# Patient Record
(demographics unavailable — no encounter records)

---

## 2025-03-27 NOTE — DISCUSSION/SUMMARY
[de-identified] : Based upon failed physical therapy failed oral medications and a gradual 2-year decline of cervical pain discomfort functionality worsening sensory deficits and now mild complaints of motor deficits recommending a cervical MRI to evaluate foraminal compromise and evaluate the extent of cervical degenerative disc disease that is evident on the x-ray x-rays clearly demonstrate C4-C5 C5-C6 cervical spondylosis.  And a previous MRI showing degenerative disc disease with there is associated neuroforaminal compromise.  Patient will follow-up after her cervical MRI is complete.  We started to discuss ACDF at C4-C5 and C5-C6 patient feels as if she has maximized conservative care over the last 2 years everything works in a very transient manner.  Physical therapy soft tissue modalities anti-inflammatories oral steroids.  She is concerned because of the worsening paresthesias and now a left upper extremity motor deficit.

## 2025-03-27 NOTE — HISTORY OF PRESENT ILLNESS
[de-identified] : Leticia is well-known to me for a lumbar spinal fusion we underwent many years ago.  She has done very very well essentially 100% asymptomatic from a lumbar surgical standpoint.  She has been battling progressive worsening neck pain for approximately 2 years.  Last MRI of the cervical spine was 2023 she has failed conservative care oral medications and she states her axial/mechanically orientate neck pain is getting worse.  She is also complaining of worsening left greater than right radiculopathy and now even a subjective motor deficit in his left upper extremity YOSEF questionnaire is negative from a lower extremity perspective but from an upper extremity she is noticing again this worsening sensory and motor deficit [Worsening] : worsening [___ yrs] : [unfilled] year(s) ago [5] : a current pain level of 5/10 [3] : an average pain level of 3/10 [1] : a minimum pain level of 1/10 [10] : a maximum pain level of 10/10 [Constant] : ~He/She~ states the symptoms seem to be constant [Bending] : worsened by bending [Lifting] : worsened by lifting [NSAIDs] : relieved by nonsteroidal anti-inflammatory drugs [Rest] : relieved by rest [Ataxia] : no ataxia [Incontinence] : no incontinence [Loss of Dexterity] : good dexterity [Urinary Ret.] : no urinary retention

## 2025-03-27 NOTE — REASON FOR VISIT
[Follow-Up Visit] : a follow-up visit for [FreeTextEntry2] : s/p: Lumbar spinal fusion L4-L5.  DOS: 05/07/2018

## 2025-03-27 NOTE — PHYSICAL EXAM
[Antalgic] : antalgic [de-identified] :  CONSTITUTIONAL:  Patient is a very pleasant individual who is well-nourished and appears stated age.  PSYCHIATRIC:  Alert and oriented times three and in no apparent distress, and participates with orthopedic evaluation well. HEAD:  Atraumatic and  nonsyndromic in appearance. EENT: No thyromegaly, EOMI. RESPIRATORY:  Respiratory rate is regular, not dyspneic on examination. LYMPHATICS:  There is no cervical or axillary lymphadenopathy. INTEGUMENTARY:  Skin is clean, dry, and intact about the bilateral upper extremities and cervical spine.  VASCULAR:   There is brisk capillary refill about the bilateral upper extremities and radial pulses are 2/4.  NEUROLOGIC: Positive L'hirmitte, positive Spurling's sign. There are no pathologic reflexes. There is a decrease in sensation of the bilateral upper extremities on manual examination.  Left upper extremity is demonstrating a more defined deficit within C5-C6 dermatomal regions.  Deep tendon reflexes are well-maintained at +2/4 of the bilateral upper extremities and are symmetric. MUSCULOSKELETAL:  There is no visible muscular atrophy.  Manual motor strength is well maintained in the bilateral upper extremities.  However  strength bicep and tricep is diminished on the left at approximately 3+ to 4/5.  Cervical range of motion is well maintained with severe apprehension starting in around 50%..  The patient ambulates in a non-myelopathic antalgic manner. Normal secondary orthopaedic exam of bilateral shoulders, elbows and hands.  Elbow flexion and extension, wrist extension, finger flexion and abduction are well maintained.    [de-identified] :   X-rays of the cervical spine have been reviewed that shows focal cervical spondylosis at C4-C5 and C5-C6 cephalad C3-C4 and C6-C7 relatively well-maintained and overall lordosis stable.    These are directly compared to x-rays in 2023 that shows slight worsening of C4-C5 and C5-C6 cervical spondylosis previous cervical MRI has been reviewed from 2023 that demonstrates L4-5 L5-S1 disc lumbar cervical degenerative disc disease foraminal compromise is noted at 3445 as well as C7 5 C6  Previous lumbar x-rays have been reviewed that shows a stable two-level spinal fusion with interbody.

## 2025-04-01 NOTE — DISCUSSION/SUMMARY
[FreeTextEntry1] : 1. CAD/STEMI- s/p PCI with JANELLE x1 to LAD 12/23/21. Completed cardiac rehab. LVEF 40-45% in 4/2022. Now normalized. Repeat TTe to assess LV function.  2. HTN- elevated. But states took her medications late. Advised to get a bp machine. BP diary.  3. HLD- On Repatha. not taking statin at this time. (ran out of medication). Per patient stable lipid panel without any statin. Blood work obtain from pcp.  4. DM- controlled per pt, managed by PMD.  5. CM- LVEF 40-45%. Appears euvolemic. On ramipril and Toprol. 6. Follow up with sleep medicine.  7. Follow up in 6-8 weeks virtually.   [EKG obtained to assist in diagnosis and management of assessed problem(s)] : EKG obtained to assist in diagnosis and management of assessed problem(s)

## 2025-04-01 NOTE — CARDIOLOGY SUMMARY
[de-identified] : 4/2025 Sinus Rhythm, no acute st/t changes  [de-identified] : 3/2023 TTE Summary: 1. Technically adequate study. 2. LV EF by Biplane MOD= 54%. 3. Left ventricular ejection fraction, by visual estimation, is 50 to 55%. 4. Normal global left ventricular systolic function. 5. Normal right ventricular size and function. 6. There is no evidence of pericardial effusion. 7. Compared to prior TTE dated 4/28/22, EF was 40-45%, EF appears normal now 50-55%.

## 2025-04-01 NOTE — HISTORY OF PRESENT ILLNESS
[FreeTextEntry1] : Patient  with PMH of DM, asthma, JULES does not use CPAP,  PVD (s/p stent), HTN, HLD, CAD s/p PCI in 2002, former smoker here for hospital follow up. She presented [facility] ED with c/o chest pain x 1 day radiated down left arm and accompanied by diaphoresis. In the ED she went into VFib arrest and shocked X1 and intubated. EKG showed a STEMI. LHC showed 99% in-stent stenosis of LAD with thrombus, most likely neoatherosclerosis. She is s/p JANELLE x 1 to LAD. RFA site benign.   She states she is feeling much better but still has soreness to chest from CPR. She denies exertional chest pain or dyspnea. Denies palpitations, edema, near syncope or syncope. She reports compliance with her medications. Denies bleeding on DAPT. She does note hives/itchiness every time she takes Brilinta.   10/2022- Undergoing cardiac rehab. Was walking yesterday and noticed symptoms of palpitations. + dizziness. Unable to lose weight.   2/8/2023 Patient here for follow up. She states she is feeling great. Denies chest pain, palpitations, SOB, PATEL, orthopnea, edema, near syncope or syncope. She is very active, walking a lot and exercises without chest pain or SOB. reports compliance with medications. Denies bleeding episodes.   4/7/2023 Patient here for follow up and to review results. TTE showed improved LVEF 50-55%. She states she is feeling good. She denies chest pain, SOB, PATEL, palpitations, edema, near syncope or syncope. She is exercising routinely without chest pain or SOB. She is planning to have right knee replacement in May. Reviewed recent lipid panel. She states she had allergic reaction to zetia (hives) and no longer taking it.   11/2023- Had one episode of chest pain, lasting for 1 minute and took NTG.   5/9/2024 Patient here for follow up. C/O left sided chest pain episode last week while doing laundry, lasted 2 mins. Describes CP as squeezing/pressure. Accompanied by tingling in her left arm/hand. She states CP similar to when she had her stents in the past. Did not take NTG.  Symptoms resolved with rest. Also had an episode of dizziness while walking home from store last week.   4/2025- NOrmal cath in 2024. NO new symptoms. Has not been using sleep apnea machine.

## 2025-05-15 NOTE — REASON FOR VISIT
[Home] : at home, [unfilled] , at the time of the visit. [Telehealth (audio & video)] : This visit was provided via telehealth using real-time 2-way audio visual technology. [Verbal consent obtained from patient] : the patient, [unfilled]

## 2025-05-15 NOTE — CARDIOLOGY SUMMARY
[de-identified] : 4/2025 Sinus Rhythm, no acute st/t changes  [de-identified] : 4/2025 TTE: LVEF 50-55%, LVIDd 5.1cm, LVOT VTI 22.10cm, RV normal size/function, trace TR, PASP 21mmHg with RAP 3mmHg, TAPSE 1.9cm 3/2023 TTE Summary: 1. Technically adequate study. 2. LV EF by Biplane MOD= 54%. 3. Left ventricular ejection fraction, by visual estimation, is 50 to 55%. 4. Normal global left ventricular systolic function. 5. Normal right ventricular size and function. 6. There is no evidence of pericardial effusion. 7. Compared to prior TTE dated 4/28/22, EF was 40-45%, EF appears normal now 50-55%.

## 2025-05-15 NOTE — DISCUSSION/SUMMARY
[FreeTextEntry1] : 1. CAD/STEMI- s/p PCI with JANELLE x1 to LAD 12/23/21. Completed cardiac rehab. LVEF 40-45% in 4/2022. Now normalized. Repeat TTE showed LVEF 50-55%.  2. HTN- elevated. BP diary with elevated systolic bps. PLan to change meotoprolol to coreg 12.5 mg po bid. Follow up in office with all pill bottles and Bp machine. Discussed renal denervation.   3. HLD- Insurance not covering Repatha.  not taking statin at this time. (ran out of medication). Per patient stable lipid panel without any statin. Blood work obtain from pcp. Will have nursing connect with patient regarding repatha coverage.  4. DM- controlled per pt, managed by PMD.  5. CM- LVEF 50-55%. Appears euvolemic. On ramipril and Toprol. 6. Follow up with sleep medicine.  7. Follow up in 2 months in office.

## 2025-05-15 NOTE — HISTORY OF PRESENT ILLNESS
[FreeTextEntry1] : Patient with PMH of DM, asthma, JULES does not use CPAP,  PVD (s/p stent), HTN, HLD, CAD s/p PCI in 2002, former smoker here for hospital follow up. She presented [facility] ED with c/o chest pain x 1 day radiated down left arm and accompanied by diaphoresis. In the ED she went into VFib arrest and shocked X1 and intubated. EKG showed a STEMI. LHC showed 99% in-stent stenosis of LAD with thrombus, most likely neoatherosclerosis. She is s/p JANELLE x 1 to LAD. RFA site benign.   She states she is feeling much better but still has soreness to chest from CPR. She denies exertional chest pain or dyspnea. Denies palpitations, edema, near syncope or syncope. She reports compliance with her medications. Denies bleeding on DAPT. She does note hives/itchiness every time she takes Brilinta.   10/2022- Undergoing cardiac rehab. Was walking yesterday and noticed symptoms of palpitations. + dizziness. Unable to lose weight.   2/8/2023 Patient here for follow up. She states she is feeling great. Denies chest pain, palpitations, SOB, PATEL, orthopnea, edema, near syncope or syncope. She is very active, walking a lot and exercises without chest pain or SOB. reports compliance with medications. Denies bleeding episodes.   4/7/2023 Patient here for follow up and to review results. TTE showed improved LVEF 50-55%. She states she is feeling good. She denies chest pain, SOB, PATEL, palpitations, edema, near syncope or syncope. She is exercising routinely without chest pain or SOB. She is planning to have right knee replacement in May. Reviewed recent lipid panel. She states she had allergic reaction to zetia (hives) and no longer taking it.   11/2023- Had one episode of chest pain, lasting for 1 minute and took NTG.   5/9/2024 Patient here for follow up. C/O left sided chest pain episode last week while doing laundry, lasted 2 mins. Describes CP as squeezing/pressure. Accompanied by tingling in her left arm/hand. She states CP similar to when she had her stents in the past. Did not take NTG.  Symptoms resolved with rest. Also had an episode of dizziness while walking home from store last week.   4/2025- Normal cath in 2024. NO new symptoms. Has not been using sleep apnea machine.   5/2025- Bps at home. 163/75,

## 2025-05-15 NOTE — HISTORY OF PRESENT ILLNESS
[FreeTextEntry1] : Patient with PMH of DM, asthma, JULES does not use CPAP,  PVD (s/p stent), HTN, HLD, CAD s/p PCI in 2002, former smoker here for hospital follow up. She presented [facility] ED with c/o chest pain x 1 day radiated down left arm and accompanied by diaphoresis. In the ED she went into VFib arrest and shocked X1 and intubated. EKG showed a STEMI. LHC showed 99% in-stent stenosis of LAD with thrombus, most likely neoatherosclerosis. She is s/p JANELLE x 1 to LAD. RFA site benign.   She states she is feeling much better but still has soreness to chest from CPR. She denies exertional chest pain or dyspnea. Denies palpitations, edema, near syncope or syncope. She reports compliance with her medications. Denies bleeding on DAPT. She does note hives/itchiness every time she takes Brilinta.   10/2022- Undergoing cardiac rehab. Was walking yesterday and noticed symptoms of palpitations. + dizziness. Unable to lose weight.   2/8/2023 Patient here for follow up. She states she is feeling great. Denies chest pain, palpitations, SOB, PATEL, orthopnea, edema, near syncope or syncope. She is very active, walking a lot and exercises without chest pain or SOB. reports compliance with medications. Denies bleeding episodes.   4/7/2023 Patient here for follow up and to review results. TTE showed improved LVEF 50-55%. She states she is feeling good. She denies chest pain, SOB, PATEL, palpitations, edema, near syncope or syncope. She is exercising routinely without chest pain or SOB. She is planning to have right knee replacement in May. Reviewed recent lipid panel. She states she had allergic reaction to zetia (hives) and no longer taking it.   11/2023- Had one episode of chest pain, lasting for 1 minute and took NTG.   5/9/2024 Patient here for follow up. C/O left sided chest pain episode last week while doing laundry, lasted 2 mins. Describes CP as squeezing/pressure. Accompanied by tingling in her left arm/hand. She states CP similar to when she had her stents in the past. Did not take NTG.  Symptoms resolved with rest. Also had an episode of dizziness while walking home from store last week.   4/2025- Normal cath in 2024. NO new symptoms. Has not been using sleep apnea machine.   5/2025- Bps at home. 163/75,

## 2025-05-15 NOTE — CARDIOLOGY SUMMARY
[de-identified] : 4/2025 Sinus Rhythm, no acute st/t changes  [de-identified] : 4/2025 TTE: LVEF 50-55%, LVIDd 5.1cm, LVOT VTI 22.10cm, RV normal size/function, trace TR, PASP 21mmHg with RAP 3mmHg, TAPSE 1.9cm 3/2023 TTE Summary: 1. Technically adequate study. 2. LV EF by Biplane MOD= 54%. 3. Left ventricular ejection fraction, by visual estimation, is 50 to 55%. 4. Normal global left ventricular systolic function. 5. Normal right ventricular size and function. 6. There is no evidence of pericardial effusion. 7. Compared to prior TTE dated 4/28/22, EF was 40-45%, EF appears normal now 50-55%.

## 2025-07-24 NOTE — HISTORY OF PRESENT ILLNESS
[Former] : former [< 20 pack-years] : < 20 pack-years [TextBox_4] : Doing well no acute pulmonary c/o no fever, chill, chest pain uses prn rescue for asthma intermittent rhinitis Has reflux- worse at night hx Mild JULES ( AHI 13)- doesnt have cpap any longer very active at home dogs at home Disability [YearQuit] : 2000

## 2025-07-24 NOTE — PHYSICAL EXAM
[General Appearance - Well Developed] : well developed [Normal Appearance] : normal appearance [General Appearance - Well Nourished] : well nourished [Normal Conjunctiva] : the conjunctiva exhibited no abnormalities [Normal Oropharynx] : normal oropharynx [II] : II [Neck Appearance] : the appearance of the neck was normal [Respiration, Rhythm And Depth] : normal respiratory rhythm and effort [Exaggerated Use Of Accessory Muscles For Inspiration] : no accessory muscle use [Auscultation Breath Sounds / Voice Sounds] : lungs were clear to auscultation bilaterally [Lungs Percussion] : the lungs were normal to percussion [Abnormal Walk] : normal gait [Nail Clubbing] : no clubbing of the fingernails [Cyanosis, Localized] : no localized cyanosis [] : no rash [No Focal Deficits] : no focal deficits [Oriented To Time, Place, And Person] : oriented to person, place, and time [Impaired Insight] : insight and judgment were intact [Affect] : the affect was normal [Memory Recent] : recent memory was not impaired [FreeTextEntry1] : no chest wall abn

## 2025-07-24 NOTE — REASON FOR VISIT
[Initial] : an initial visit [Sleep Apnea] : sleep apnea [Follow-Up] : a follow-up visit [Asthma] : asthma [Cough] : cough

## 2025-07-24 NOTE — CONSULT LETTER
[Dear  ___] : Dear  [unfilled], [Consult Letter:] : I had the pleasure of evaluating your patient, [unfilled]. [Please see my note below.] : Please see my note below. [Sincerely,] : Sincerely, [DrRoderick  ___] : Dr. ROBISON [FreeTextEntry3] : [facility] Brownlee DO [facility]\par  Pulmonary Critical Care\par  Director Pulmonary Division\par  Medical Director Respiratory Therapy\par  [facility]\par  \par

## 2025-07-24 NOTE — DISCUSSION/SUMMARY
[FreeTextEntry1] : Asthma, mild intermittent, allergic phenotype, Mild JULES ( AHI 13) 9/18, no longer has machine, needs updated study CAD, distant LAD stent, post V fib arrest- now normal LV current exam without bronchospasm, normal sp02 spirometry remains normal, prn rescue need baseline CXR Home sleep study ordered- sleep team will contact 6 months or sooner if needed, will call with above